# Patient Record
Sex: FEMALE | Race: WHITE | URBAN - METROPOLITAN AREA
[De-identification: names, ages, dates, MRNs, and addresses within clinical notes are randomized per-mention and may not be internally consistent; named-entity substitution may affect disease eponyms.]

---

## 2017-01-03 ENCOUNTER — HOSPITAL ENCOUNTER (OUTPATIENT)
Dept: BEHAVIORAL HEALTH | Facility: CLINIC | Age: 27
End: 2017-01-03
Attending: SOCIAL WORKER
Payer: COMMERCIAL

## 2017-01-03 PROCEDURE — H2035 A/D TX PROGRAM, PER HOUR: HCPCS | Mod: HQ

## 2017-01-04 ENCOUNTER — HOSPITAL ENCOUNTER (OUTPATIENT)
Dept: BEHAVIORAL HEALTH | Facility: CLINIC | Age: 27
End: 2017-01-04
Attending: SOCIAL WORKER
Payer: COMMERCIAL

## 2017-01-04 PROCEDURE — H2035 A/D TX PROGRAM, PER HOUR: HCPCS | Mod: HQ

## 2017-01-05 ENCOUNTER — HOSPITAL ENCOUNTER (OUTPATIENT)
Dept: BEHAVIORAL HEALTH | Facility: CLINIC | Age: 27
End: 2017-01-05
Attending: SOCIAL WORKER
Payer: COMMERCIAL

## 2017-01-05 PROCEDURE — H2035 A/D TX PROGRAM, PER HOUR: HCPCS | Mod: HQ
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
## 2017-01-12 ENCOUNTER — HOSPITAL ENCOUNTER (OUTPATIENT)
Dept: BEHAVIORAL HEALTH | Facility: CLINIC | Age: 27
End: 2017-01-12
Attending: SOCIAL WORKER
Payer: COMMERCIAL

## 2017-01-12 PROCEDURE — H2035 A/D TX PROGRAM, PER HOUR: HCPCS | Mod: HQ
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
## 2017-01-17 ENCOUNTER — HOSPITAL ENCOUNTER (OUTPATIENT)
Dept: BEHAVIORAL HEALTH | Facility: CLINIC | Age: 27
End: 2017-01-17
Attending: SOCIAL WORKER
Payer: COMMERCIAL

## 2017-01-17 PROCEDURE — H2035 A/D TX PROGRAM, PER HOUR: HCPCS | Mod: HQ

## 2017-01-18 ENCOUNTER — HOSPITAL ENCOUNTER (OUTPATIENT)
Dept: BEHAVIORAL HEALTH | Facility: CLINIC | Age: 27
End: 2017-01-18
Attending: SOCIAL WORKER
Payer: COMMERCIAL

## 2017-01-18 PROCEDURE — H2035 A/D TX PROGRAM, PER HOUR: HCPCS | Mod: HQ

## 2017-01-19 ENCOUNTER — HOSPITAL ENCOUNTER (OUTPATIENT)
Dept: BEHAVIORAL HEALTH | Facility: CLINIC | Age: 27
End: 2017-01-19
Attending: SOCIAL WORKER
Payer: COMMERCIAL

## 2017-01-19 PROCEDURE — H2035 A/D TX PROGRAM, PER HOUR: HCPCS | Mod: HQ
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
## 2017-01-23 ENCOUNTER — HOSPITAL ENCOUNTER (OUTPATIENT)
Dept: BEHAVIORAL HEALTH | Facility: CLINIC | Age: 27
End: 2017-01-23
Attending: SOCIAL WORKER
Payer: COMMERCIAL

## 2017-01-23 PROCEDURE — H2035 A/D TX PROGRAM, PER HOUR: HCPCS | Mod: HQ
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
## 2017-01-25 ENCOUNTER — TELEPHONE (OUTPATIENT)
Dept: ADDICTION MEDICINE | Facility: CLINIC | Age: 27
End: 2017-01-25

## 2017-01-25 ENCOUNTER — OFFICE VISIT (OUTPATIENT)
Dept: ADDICTION MEDICINE | Facility: CLINIC | Age: 27
End: 2017-01-25
Payer: COMMERCIAL

## 2017-01-25 VITALS
WEIGHT: 156 LBS | HEIGHT: 64 IN | DIASTOLIC BLOOD PRESSURE: 76 MMHG | SYSTOLIC BLOOD PRESSURE: 132 MMHG | OXYGEN SATURATION: 99 % | RESPIRATION RATE: 16 BRPM | HEART RATE: 91 BPM | BODY MASS INDEX: 26.63 KG/M2

## 2017-01-25 DIAGNOSIS — F19.20 CHEMICAL DEPENDENCY (H): ICD-10-CM

## 2017-01-25 DIAGNOSIS — F41.1 GAD (GENERALIZED ANXIETY DISORDER): ICD-10-CM

## 2017-01-25 DIAGNOSIS — F11.20 UNCOMPLICATED OPIOID DEPENDENCE (H): Primary | ICD-10-CM

## 2017-01-25 LAB
AMPHETAMINES UR QL: NORMAL
BARBITURATES UR QL: NORMAL
BENZODIAZ UR QL: NORMAL
BUPRENORPHINE UR QL: POSITIVE
CANNABINOIDS UR QL: NORMAL
COCAINE UR QL: NORMAL
MDA UR QL SCN: NORMAL
METHADONE UR QL SCN: NORMAL
METHAMPHET UR QL SCN: NORMAL
OPIATES UR QL SCN: NORMAL
OXYCODONE UR QL: NORMAL
PCP UR QL SCN: NORMAL
TRICYCLICS UR QL SCN: NORMAL

## 2017-01-25 PROCEDURE — 80305 DRUG TEST PRSMV DIR OPT OBS: CPT | Performed by: FAMILY MEDICINE

## 2017-01-25 PROCEDURE — 99214 OFFICE O/P EST MOD 30 MIN: CPT | Performed by: FAMILY MEDICINE

## 2017-01-25 PROCEDURE — 80306 DRUG TEST PRSMV INSTRMNT: CPT | Performed by: FAMILY MEDICINE

## 2017-01-25 RX ORDER — ESCITALOPRAM OXALATE 20 MG/1
20 TABLET ORAL DAILY
Qty: 90 TABLET | Refills: 1 | Status: SHIPPED | OUTPATIENT
Start: 2017-01-25 | End: 2017-03-20

## 2017-01-25 RX ORDER — BUPRENORPHINE HYDROCHLORIDE AND NALOXONE HYDROCHLORIDE DIHYDRATE 2; .5 MG/1; MG/1
1 TABLET SUBLINGUAL 3 TIMES DAILY
Qty: 84 TABLET | Refills: 0 | Status: SHIPPED | OUTPATIENT
Start: 2017-01-25 | End: 2017-02-17

## 2017-01-25 NOTE — TELEPHONE ENCOUNTER
PA for Suboxone 2-0.5MG tablets were submitted via [employer] at [phone]. PA was APPROVED 1/25/2017 through 1/25/2018.     Authorization# [id number]      Екатерина Baltazar MA

## 2017-01-25 NOTE — MR AVS SNAPSHOT
After Visit Summary   1/25/2017    Devika Hernández    MRN: [id number]           Patient Information     Date Of Birth          1990        Visit Information        Provider Department      1/25/2017 11:00 AM Daniel Hyatt MD [facility]        Today's Diagnoses     Uncomplicated opioid dependence (H)    -  1     Chemical dependency (H)            Follow-ups after your visit        Your next 10 appointments already scheduled     Jan 30, 2017 10:00 AM   Treatment with CO-OCCURRING DOP MIXED PHASE 2   [facility] ([facility])    [address] [place] [address]   [phone]            Feb 06, 2017 10:00 AM   Treatment with CO-OCCURRING DOP MIXED PHASE 2   [facility] ([facility])    [address] [place] [address]   [phone]            Feb 13, 2017 10:00 AM   Treatment with CO-OCCURRING DOP MIXED PHASE 2   [facility] ([facility])    [address] [place] [address]   [phone]            Feb 20, 2017 10:00 AM   Treatment with CO-OCCURRING DOP MIXED PHASE 2   [facility] ([facility])    [address] [place] [address]   [phone]            Feb 27, 2017 10:00 AM   Treatment with CO-OCCURRING DOP MIXED PHASE 2   [facility] ([facility])    [facility] [address] [address]   [phone]            Mar 06, 2017 10:00 AM   Treatment with CO-OCCURRING DOP MIXED PHASE 2   [facility] ([facility])    [facility] [address] [address]   [phone]            Mar 13, 2017 10:00 AM   Treatment  "with CO-OCCURRING DOP MIXED PHASE 2   [facility] ([facility])    [facility] [address] [address]   [phone]            Mar 20, 2017 10:00 AM   Treatment with CO-OCCURRING DOP MIXED PHASE 2   [facility] ([facility])    [address] [address]   [phone]            Mar 27, 2017 10:00 AM   Treatment with CO-OCCURRING DOP MIXED PHASE 2   [facility] ([facility])    [address] [place] [address]   [phone]            Apr 03, 2017 10:00 AM   Treatment with CO-OCCURRING DOP MIXED PHASE 2   [facility] ([facility])    [address] [place] [address]   [phone]              Who to contact     If you have questions or need follow up information about today's clinic visit or your schedule please contact [facility] directly at [phone].  Normal or non-critical lab and imaging results will be communicated to you by MyChart, letter or phone within 4 business days after the clinic has received the results. If you do not hear from us within 7 days, please contact the clinic through [employer] or phone. If you have a critical or abnormal lab result, we will notify you by phone as soon as possible.  Submit refill requests through [employer] or call your pharmacy and they will forward the refill request to us. Please allow 3 business days for your refill to be completed.          Additional Information About Your Visit        MyChart Information     HelpMeNow lets you send messages to your doctor, view your test results, renew your prescriptions, schedule appointments and more. To sign up, go to [email or web address] . Click on \"Log in\" on the left side of the " "screen, which will take you to the Welcome page. Then click on \"Sign up Now\" on the right side of the page.     You will be asked to enter the access code listed below, as well as some personal information. Please follow the directions to create your username and password.     Your access code is: [email or web address]  Expires: 2017 11:20 AM     Your access code will  in 90 days. If you need help or a new code, please call your [facility] or [phone].        Care EveryWhere ID     This is your Care EveryWhere ID. This could be used by other organizations to access your [place] medical records  [id number]        Your Vitals Were     Pulse Respirations Height BMI (Body Mass Index) Pulse Oximetry       91 16 5' 4\" (1.626 m) 26.76 kg/m2 99%        Blood Pressure from Last 3 Encounters:   17 132/76   16 122/68   16 128/69    Weight from Last 3 Encounters:   17 156 lb (70.761 kg)   16 156 lb 8 oz (70.988 kg)   16 161 lb (73.029 kg)              We Performed the Following     Buprenorphine Qual Urine     Drug abuse screen ([place], [place])          Where to get your medicines      Some of these will need a paper prescription and others can be bought over the counter.  Ask your nurse if you have questions.     Bring a paper prescription for each of these medications    - buprenorphine-naloxone 2-0.5 MG Subl sublingual tablet       Primary Care Provider    Physician No Ref-Primary       No address on file        Thank you!     Thank you for choosing [facility]  for your care. Our goal is always to provide you with excellent care. Hearing back from our patients is one way we can continue to improve our services. Please take a few minutes to complete the written survey that you may receive in the mail after your visit with us. Thank you!             Your Updated Medication List - Protect others around you: Learn how to safely use, store and throw away your " medicines at www.disposemymeds.org.          This list is accurate as of: 1/25/17 11:20 AM.  Always use your most recent med list.                   Brand Name Dispense Instructions for use    acetaminophen 325 MG tablet    TYLENOL     Take 325-650 mg by mouth every 4 hours as needed for mild pain       alum & mag hydroxide-simethicone 200-200-20 MG/5ML Susp suspension    MYLANTA/MAALOX     Take 30 mLs by mouth every 6 hours as needed for indigestion       bisacodyl 10 MG Suppository    DULCOLAX    25 suppository    Place 1 suppository (10 mg) rectally daily as needed for constipation       * buprenorphine-naloxone 2-0.5 MG Subl sublingual tablet    SUBOXONE    84 tablet    Place 1 tablet under the tongue 3 times daily       * buprenorphine-naloxone 2-0.5 MG Subl sublingual tablet    SUBOXONE    84 tablet    Place 1 tablet under the tongue 3 times daily       calcium carbonate 500 MG chewable tablet    TUMS     Take 2 chew tab by mouth 3 times daily as needed for heartburn Not to exceed 8 tabs in 24 hrs       escitalopram 10 MG tablet    LEXAPRO    30 tablet    Take 1 tablet (10 mg) by mouth daily       guaiFENesin 20 mg/mL Soln solution    ROBITUSSIN     Take 10 mLs by mouth every 4 hours as needed for cough       hydrOXYzine 50 MG capsule    VISTARIL    120 capsule    Take 2 capsules (100 mg) by mouth 3 times daily as needed for anxiety       ibuprofen 800 MG tablet    ADVIL/MOTRIN    60 tablet    Take 1 tablet (800 mg) by mouth every 8 hours as needed for moderate pain       loperamide 2 MG capsule    IMODIUM     Take 2 mg by mouth 4 times daily as needed for diarrhea       loratadine 10 MG tablet    CLARITIN     Take 10 mg by mouth daily as needed for allergies       Multi-vitamin Tabs tablet      Take 1 tablet by mouth daily Women's vit       phenol-menthol 14.5 MG lozenge      Place 1 lozenge inside cheek every 2 hours as needed for moderate pain       senna-docusate 8.6-50 MG per tablet     SENOKOT-S;PERICOLACE     Take 2 tablets by mouth daily       traZODone 50 MG tablet    DESYREL    30 tablet    Take 1-2 tablets ( mg) by mouth nightly as needed for sleep       * Notice:  This list has 2 medication(s) that are the same as other medications prescribed for you. Read the directions carefully, and ask your doctor or other care provider to review them with you.

## 2017-01-26 RX ORDER — BUPRENORPHINE HYDROCHLORIDE AND NALOXONE HYDROCHLORIDE DIHYDRATE 2; .5 MG/1; MG/1
1 TABLET SUBLINGUAL 3 TIMES DAILY
Qty: 84 TABLET | Refills: 0 | Status: SHIPPED | OUTPATIENT
Start: 2017-01-26 | End: 2017-03-20

## 2017-01-26 NOTE — PROGRESS NOTES
SUBJECTIVE:                                                    Devika Hernández is a 26 year old female who presents to clinic today for the following health issues:    OPIOID USE DISORDER - SUBOXONE FOLLOW UP:  CURRENT DOSE: 2 mg tid      DOING WELL   IN AFTERCARE  HERE WITH CHILD; VERY MOTIVATED  BROKE OFF RELATIONSHIP WITH CHILD'S FATHER  GOING TO MEETINGS          Status since last visit:    Since last visit patient has been: doing well.     Intensity:     There has been: no craving.      Suboxone Dose: adequate.   Progression of Symptoms:     Cues to use and relapse triggers: NONE    Recovery program has been: solid.   Accompanying Signs & Symptoms:    Side Effects: none.    Sobriety:     Status: no use since last visit.      Drug Screen: obtained.   Precipitating factors:    Triggers have been: mild.   Alleviating factors:    Contact with sponsor has been: helpful.     Family and support system has been: helpful.   Other Therapies Tried :     Patient has been going to recovery meetings:regularly.      Patient has been participating in professional counseling/therapy: YES    [place] Board of Pharmacy Data Base Reviewed:    YES; NO ISSUES    Problem list and histories reviewed & adjusted, as indicated.  Additional history: as documented    Patient Active Problem List   Diagnosis     Chemical dependency (H)     Uncomplicated opioid dependence (H)     No past surgical history on file.    Social History   Substance Use Topics     Smoking status: Current Every Day Smoker -- 1.00 packs/day     Smokeless tobacco: Not on file     Alcohol Use: No     Family History   Problem Relation Age of Onset     Depression Mother      Substance Abuse Mother      Substance Abuse Maternal Grandfather      Depression Sister      Anxiety Disorder Sister      Depression Other      Schizophrenia Other      Bipolar Disorder Other          Current Outpatient Prescriptions   Medication Sig Dispense Refill     buprenorphine-naloxone  (SUBOXONE) 2-0.5 MG SUBL sublingual tablet Place 1 tablet under the tongue 3 times daily 84 tablet 0     escitalopram (LEXAPRO) 20 MG tablet Take 1 tablet (20 mg) by mouth daily 90 tablet 1     escitalopram (LEXAPRO) 10 MG tablet Take 1 tablet (10 mg) by mouth daily 30 tablet 1     ibuprofen (ADVIL,MOTRIN) 800 MG tablet Take 1 tablet (800 mg) by mouth every 8 hours as needed for moderate pain 60 tablet 1     buprenorphine-naloxone (SUBOXONE) 2-0.5 MG SUBL Place 1 tablet under the tongue 3 times daily 84 tablet 0     traZODone (DESYREL) 50 MG tablet Take 1-2 tablets ( mg) by mouth nightly as needed for sleep 30 tablet 1     alum & mag hydroxide-simethicone (MYLANTA/MAALOX) 200-200-20 MG/5ML SUSP Take 30 mLs by mouth every 6 hours as needed for indigestion       guaiFENesin (ROBITUSSIN) 20 mg/mL SOLN Take 10 mLs by mouth every 4 hours as needed for cough       phenol-menthol (CEPASTAT) 14.5 MG lozenge Place 1 lozenge inside cheek every 2 hours as needed for moderate pain       loratadine (CLARITIN) 10 MG tablet Take 10 mg by mouth daily as needed for allergies       loperamide (IMODIUM) 2 MG capsule Take 2 mg by mouth 4 times daily as needed for diarrhea       senna-docusate (SENOKOT-S;PERICOLACE) 8.6-50 MG per tablet Take 2 tablets by mouth daily       multivitamin, therapeutic with minerals (MULTI-VITAMIN) TABS Take 1 tablet by mouth daily Women's vit       calcium carbonate (TUMS) 500 MG chewable tablet Take 2 chew tab by mouth 3 times daily as needed for heartburn Not to exceed 8 tabs in 24 hrs       bisacodyl (DULCOLAX) 10 MG suppository Place 1 suppository (10 mg) rectally daily as needed for constipation 25 suppository 1     hydrOXYzine (VISTARIL) 50 MG capsule Take 2 capsules (100 mg) by mouth 3 times daily as needed for anxiety 120 capsule 2     acetaminophen (TYLENOL) 325 MG tablet Take 325-650 mg by mouth every 4 hours as needed for mild pain       Allergies   Allergen Reactions     Blood-Group  "Specific Substance Other (See Comments)     Patient has an anti-[place], anti-Jkb ([place] b) and a Non-Specific antibodies. Blood product orders may be delayed. Draw two purple top tubes and one red top tube for all Type and Screen/Type and Crossmatch orders.     Ciprofloxacin Swelling     Labs reviewed in EPIC        OBJECTIVE:                                                    /76 mmHg  Pulse 91  Resp 16  Ht 5' 4\" (1.626 m)  Wt 156 lb (70.761 kg)  BMI 26.76 kg/m2  SpO2 99%  Body mass index is 26.76 kg/(m^2).     ROS:  Constitutional, HEENT, cardiovascular, pulmonary, gi and gu systems are negative, except as otherwise noted.    EXAM:  GENERAL APPEARANCE: healthy, alert and no distress  EYES: Eyes grossly normal to inspection, PERRL and conjunctivae and sclerae normal  NEURO: Normal strength and tone, mentation intact and speech normal  PSYCH: mentation appears normal and affect normal/bright  MENTAL STATUS EXAM:  Appearance/Behavior: No apparent distress and Casually groomed  Speech: Normal  Mood/Affect: normal affect  Insight: Adequate    Diagnostic Test Results:  Results for orders placed or performed in visit on 01/25/17   Buprenorphine Qual Urine   Result Value Ref Range    Buprenorphine Qual Urine Positive    Drug abuse screen (NL, RW)   Result Value Ref Range    Methamphetamine Qual Urine  NEG     Negative   Cutoff for a negative methamphetamine is 1000 ng/mL or less.      Cocaine Qual Urine  NEG     Negative   Cutoff for a negative cocaine is 300 ng/mL or less.      Cannabinoids Qual Urine  NEG     Negative   Cutoff for a negative cannabinoid is 50 ng/mL or less.      MDMA Qual Urine  NEG     Negative   Cutoff for a negative MDMA (ecstasy) is 500 ng/mL or less.      Methadone Qual Urine  NEG     Negative   Cutoff for a negative methadone is 300 ng/mL or less.      Opiates Qualitative Urine  NEG     Negative   Cutoff for a negative opiate is 300 ng/mL or less.      Benzodiazepine Qual Urine  NEG     " Negative   Cutoff for a negative benzodiazepine is 300 ng/mL or less.      Tricyc Anti Qual Urine  NEG     Negative   Cutoff for a negative tricyclic antidepressant is 1000 ng/mL or less.      Barbiturates Qual Urine  NEG     Negative   Cutoff for a negative barbituate is 300 ng/mL or less.      PCP Qual Urine  NEG     Negative   Cutoff for a negative PCP is 25 ng/mL or less.      Amphetamine Qual Urine  NEG     Negative   Cutoff for a negative amphetamine is 1000 ng/mL or less.      Oxycodone Qual Urine  NEG     Negative   Cutoff for a negative Oxycodone is 100 ng/mL or less.          ASSESSMENT:                                                    OPIOID USE DISORDER     ENCOUNTER FOR LONG TERM USE OF HIGH RISK MEDICATION   High Risk Drug Monitoring?  YES   Drug being monitored: Suboxone    Reason for drug: Opioid Use Disorder   What is being monitored?: Dosage, Cravings, Trigger, side effects, and continued abstinence.         PLAN:                                                        ICD-10-CM    1. Uncomplicated opioid dependence (H) F11.20 buprenorphine-naloxone (SUBOXONE) 2-0.5 MG SUBL sublingual tablet     buprenorphine-naloxone (SUBOXONE) 2-0.5 MG SUBL sublingual tablet   2. Chemical dependency (H) F19.20 Buprenorphine Qual Urine     Drug abuse screen ([place], RW)     buprenorphine-naloxone (SUBOXONE) 2-0.5 MG SUBL sublingual tablet   3. SYLVESTER (generalized anxiety disorder) F41.1 escitalopram (LEXAPRO) 20 MG tablet         MEDICATIONS:   Orders Placed This Encounter   Medications     buprenorphine-naloxone (SUBOXONE) 2-0.5 MG SUBL sublingual tablet     Sig: Place 1 tablet under the tongue 3 times daily     Dispense:  84 tablet     Refill:  0     escitalopram (LEXAPRO) 20 MG tablet     Sig: Take 1 tablet (20 mg) by mouth daily     Dispense:  90 tablet     Refill:  1          - Continue other medications without change  FUTURE APPOINTMENTS:       - Follow-up visit in 1 MONTH    Daniel Hyatt MD  [place]  [facility]

## 2017-01-30 ENCOUNTER — HOSPITAL ENCOUNTER (OUTPATIENT)
Dept: BEHAVIORAL HEALTH | Facility: CLINIC | Age: 27
End: 2017-01-30
Attending: SOCIAL WORKER
Payer: COMMERCIAL

## 2017-01-30 PROCEDURE — H2035 A/D TX PROGRAM, PER HOUR: HCPCS | Mod: HQ
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
## 2017-02-06 ENCOUNTER — HOSPITAL ENCOUNTER (OUTPATIENT)
Dept: BEHAVIORAL HEALTH | Facility: CLINIC | Age: 27
End: 2017-02-06
Attending: SOCIAL WORKER
Payer: COMMERCIAL

## 2017-02-06 PROCEDURE — H2035 A/D TX PROGRAM, PER HOUR: HCPCS | Mod: HQ
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
## 2017-02-13 ENCOUNTER — HOSPITAL ENCOUNTER (OUTPATIENT)
Dept: BEHAVIORAL HEALTH | Facility: CLINIC | Age: 27
End: 2017-02-13
Attending: SOCIAL WORKER
Payer: COMMERCIAL

## 2017-02-13 PROCEDURE — H2035 A/D TX PROGRAM, PER HOUR: HCPCS | Mod: HQ
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
## 2017-02-17 ENCOUNTER — TELEPHONE (OUTPATIENT)
Dept: ADDICTION MEDICINE | Facility: CLINIC | Age: 27
End: 2017-02-17

## 2017-02-17 DIAGNOSIS — F19.20 CHEMICAL DEPENDENCY (H): ICD-10-CM

## 2017-02-17 DIAGNOSIS — F11.20 UNCOMPLICATED OPIOID DEPENDENCE (H): ICD-10-CM

## 2017-02-17 RX ORDER — BUPRENORPHINE HYDROCHLORIDE AND NALOXONE HYDROCHLORIDE DIHYDRATE 2; .5 MG/1; MG/1
1 TABLET SUBLINGUAL 2 TIMES DAILY
Qty: 42 TABLET | Refills: 0 | Status: SHIPPED | OUTPATIENT
Start: 2017-02-17 | End: 2017-02-27

## 2017-02-17 NOTE — TELEPHONE ENCOUNTER
Staff called [facility] at [phone] regarding PA submit for SUBOXONE Sub Tablets 2-0.5mg. Staff was informed PA was APPROVED 1- for 42 Tablets for 21 days can be filled on February 20, 2017.  Staff was informed by [facility] representative too soon for refills. Staff called [facility] [place] at [phone] to inform above information. Staff was informed by [facility] Pharmacist patient has not have Rx's filled since October 2016 and has been going to numerous pharmacies and very hard to know when the patient is needing refills. Pharmacist informed staff patient is going out of state and wanted her Rx filled sooner. Staff will forward message to prescribing provider.      Екатерина Baltazar MA

## 2017-02-17 NOTE — TELEPHONE ENCOUNTER
Incoming call from pt requesting a bridge for subx. Pt is unable to attend her appt on 02/20, because she is traveling to [place].   Please send rx to [facility] - [place], [place] - [address]

## 2017-02-18 NOTE — TELEPHONE ENCOUNTER
Returned call; left message.  Advised if needs prescription called into pharmacy in [place] on 2/20/17 she can call us back

## 2017-02-27 ENCOUNTER — HOSPITAL ENCOUNTER (OUTPATIENT)
Dept: BEHAVIORAL HEALTH | Facility: CLINIC | Age: 27
End: 2017-02-27
Attending: SOCIAL WORKER
Payer: COMMERCIAL

## 2017-02-27 DIAGNOSIS — F19.20 CHEMICAL DEPENDENCY (H): ICD-10-CM

## 2017-02-27 DIAGNOSIS — F11.20 UNCOMPLICATED OPIOID DEPENDENCE (H): ICD-10-CM

## 2017-02-27 RX ORDER — BUPRENORPHINE HYDROCHLORIDE AND NALOXONE HYDROCHLORIDE DIHYDRATE 2; .5 MG/1; MG/1
1 TABLET SUBLINGUAL 2 TIMES DAILY
Qty: 42 TABLET | Refills: 0 | Status: SHIPPED | OUTPATIENT
Start: 2017-02-27 | End: 2017-03-20

## 2017-02-27 NOTE — TELEPHONE ENCOUNTER
Fax received 02/27/17    buprenorphine-naloxone (SUBOXONE) 2-0.5 MG SUBL      Last Written Prescription Date: 10/18/16  Last Fill Quantity: 15,  # refills: 0   Last Office Visit with [facility], P or [facility] prescribing provider: 01/25/17                                         Next 5 appointments (look out 90 days)     Mar 09, 2017  3:30 PM CST   Return Visit with Daniel Hyatt MD   [facility] ([facility])    [address] [place] [address]  [address]  [facility] [id number] [phone]

## 2017-02-27 NOTE — PROGRESS NOTES
2/27/17  Client called and LM saying they had just gotten back from [place] and will not be in group today.    Karan Green, [facility], LMFT

## 2017-03-09 ENCOUNTER — TELEPHONE (OUTPATIENT)
Dept: ADDICTION MEDICINE | Facility: CLINIC | Age: 27
End: 2017-03-09

## 2017-03-14 ENCOUNTER — TELEPHONE (OUTPATIENT)
Dept: ADDICTION MEDICINE | Facility: CLINIC | Age: 27
End: 2017-03-14

## 2017-03-14 NOTE — TELEPHONE ENCOUNTER
Reason for Call:  Other appointment    Detailed comments: pt cancel her appt for today her son is still sick, pt rescheduled for 4/10, but pt wants to know if Dr. Hyatt can see her on 3/20 instead.    Phone Number Patient can be reached at: Home number on file [phone] (home)    Best Time: anytime    Can we leave a detailed message on this number? YES    Call taken on 3/14/2017 at 8:42 AM by Yan Hollis

## 2017-03-16 ENCOUNTER — TELEPHONE (OUTPATIENT)
Dept: ADDICTION MEDICINE | Facility: CLINIC | Age: 27
End: 2017-03-16

## 2017-03-16 NOTE — TELEPHONE ENCOUNTER
Reason for Call:  Medication request Clonidine Patch    Detailed comments: pt would like Dr. Hyatt to prescribed Clonidine patch to help her with withdrawal symptoms.    Phone Number Patient can be reached at: Home number on file [phone] (home)    Best Time: anytime    Can we leave a detailed message on this number? YES    Call taken on 3/16/2017 at 1:20 PM by Yan Hollis

## 2017-03-17 NOTE — TELEPHONE ENCOUNTER
Returned call; left message.  Asked patient to let me know if she can come in 3/20/17  Offered calling in a bridge of Suboxone

## 2017-03-20 ENCOUNTER — OFFICE VISIT (OUTPATIENT)
Dept: ADDICTION MEDICINE | Facility: CLINIC | Age: 27
End: 2017-03-20
Payer: COMMERCIAL

## 2017-03-20 VITALS
WEIGHT: 153.5 LBS | DIASTOLIC BLOOD PRESSURE: 88 MMHG | RESPIRATION RATE: 16 BRPM | BODY MASS INDEX: 26.35 KG/M2 | SYSTOLIC BLOOD PRESSURE: 141 MMHG | TEMPERATURE: 98.4 F | HEART RATE: 93 BPM | OXYGEN SATURATION: 100 %

## 2017-03-20 DIAGNOSIS — F41.1 GAD (GENERALIZED ANXIETY DISORDER): ICD-10-CM

## 2017-03-20 DIAGNOSIS — F11.20 UNCOMPLICATED OPIOID DEPENDENCE (H): ICD-10-CM

## 2017-03-20 DIAGNOSIS — F19.20 CHEMICAL DEPENDENCY (H): ICD-10-CM

## 2017-03-20 LAB
AMPHETAMINES UR QL: ABNORMAL NG/ML
BARBITURATES UR QL SCN: ABNORMAL NG/ML
BENZODIAZ UR QL SCN: ABNORMAL NG/ML
BUPRENORPHINE UR QL: ABNORMAL NG/ML
CANNABINOIDS UR QL: ABNORMAL NG/ML
COCAINE UR QL SCN: ABNORMAL NG/ML
D-METHAMPHET UR QL: ABNORMAL NG/ML
METHADONE UR QL SCN: ABNORMAL NG/ML
OPIATES UR QL SCN: ABNORMAL NG/ML
OXYCODONE UR QL SCN: ABNORMAL NG/ML
PCP UR QL SCN: ABNORMAL NG/ML
PROPOXYPH UR QL: ABNORMAL NG/ML
TRICYCLICS UR QL SCN: ABNORMAL NG/ML

## 2017-03-20 PROCEDURE — 80306 DRUG TEST PRSMV INSTRMNT: CPT | Performed by: FAMILY MEDICINE

## 2017-03-20 PROCEDURE — 99214 OFFICE O/P EST MOD 30 MIN: CPT | Performed by: FAMILY MEDICINE

## 2017-03-20 RX ORDER — BUPRENORPHINE HYDROCHLORIDE AND NALOXONE HYDROCHLORIDE DIHYDRATE 2; .5 MG/1; MG/1
TABLET SUBLINGUAL
Qty: 42 TABLET | Refills: 0 | Status: SHIPPED | OUTPATIENT
Start: 2017-03-20 | End: 2017-04-17

## 2017-03-20 RX ORDER — ESCITALOPRAM OXALATE 20 MG/1
20 TABLET ORAL DAILY
Qty: 90 TABLET | Refills: 1 | Status: SHIPPED | OUTPATIENT
Start: 2017-03-20

## 2017-03-20 NOTE — MR AVS SNAPSHOT
After Visit Summary   3/20/2017    Devika Hernández    MRN: [id number]           Patient Information     Date Of Birth          1990        Visit Information        Provider Department      3/20/2017 1:45 PM Daniel Hyatt MD [facility]        Today's Diagnoses     Chemical dependency (H)        Uncomplicated opioid dependence (H)        SYLVESTER (generalized anxiety disorder)           Follow-ups after your visit        Your next 10 appointments already scheduled     Mar 27, 2017 10:00 AM CDT   Treatment with CO-OCCURRING DOP MIXED PHASE 2   [facility] ([facility])    [address] [place] [id number]   [phone]            Apr 03, 2017 10:00 AM CDT   Treatment with CO-OCCURRING DOP MIXED PHASE 2   [facility] ([facility])    [address] [place] [address]   [phone]            Apr 10, 2017 10:00 AM CDT   Treatment with CO-OCCURRING DOP MIXED PHASE 2   [facility] ([facility])    [address] [place] [address]   [phone]            Apr 10, 2017 11:00 AM CDT   Return Visit with Daniel Hyatt MD   [facility] ([facility])    [address] [place] [address]  [address] [place] [address]   [phone]              Who to contact     If you have questions or need follow up information about today's clinic visit or your schedule please contact [facility] directly at [phone].  Normal or non-critical lab and imaging results will be communicated to you by MyChart, letter or phone within 4 business days after the clinic has received the results. If you do not hear from us within 7 days, please contact the clinic through MyChart or phone. If  "you have a critical or abnormal lab result, we will notify you by phone as soon as possible.  Submit refill requests through [employer] or call your pharmacy and they will forward the refill request to us. Please allow 3 business days for your refill to be completed.          Additional Information About Your Visit        ugichemhart Information     [employer] lets you send messages to your doctor, view your test results, renew your prescriptions, schedule appointments and more. To sign up, go to [email or web address] . Click on \"Log in\" on the left side of the screen, which will take you to the Welcome page. Then click on \"Sign up Now\" on the right side of the page.     You will be asked to enter the access code listed below, as well as some personal information. Please follow the directions to create your username and password.     Your access code is: [email or web address]  Expires: 2017 12:20 PM     Your access code will  in 90 days. If you need help or a new code, please call your [place] clinic or [phone].        Care EveryWhere ID     This is your Care EveryWhere ID. This could be used by other organizations to access your [place] medical records  [id number]        Your Vitals Were     Pulse Temperature Respirations Pulse Oximetry BMI (Body Mass Index)       93 98.4  F (36.9  C) (Oral) 16 100% 26.35 kg/m2        Blood Pressure from Last 3 Encounters:   17 141/88   17 132/76   16 122/68    Weight from Last 3 Encounters:   17 153 lb 8 oz (69.6 kg)   17 156 lb (70.8 kg)   16 156 lb 8 oz (71 kg)              Today, you had the following     No orders found for display         Today's Medication Changes          These changes are accurate as of: 3/20/17  2:09 PM.  If you have any questions, ask your nurse or doctor.               These medicines have changed or have updated prescriptions.        Dose/Directions    buprenorphine-naloxone 2-0.5 MG Subl sublingual tablet "   Commonly known as:  SUBOXONE   This may have changed:    - how much to take  - how to take this  - when to take this  - additional instructions  - Another medication with the same name was removed. Continue taking this medication, and follow the directions you see here.   Used for:  Chemical dependency (H), Uncomplicated opioid dependence (H)   Changed by:  Daniel Hyatt MD        Take 1.5 tabs daily in divided doses   Quantity:  42 tablet   Refills:  0       escitalopram 20 MG tablet   Commonly known as:  LEXAPRO   This may have changed:  Another medication with the same name was removed. Continue taking this medication, and follow the directions you see here.   Used for:  SYLVESTER (generalized anxiety disorder)   Changed by:  Daniel Hyatt MD        Dose:  20 mg   Take 1 tablet (20 mg) by mouth daily   Quantity:  90 tablet   Refills:  1            Where to get your medicines      These medications were sent to [facility] [address] [place] [address] AT [facility] & [address] [address]     Phone:  [phone]     escitalopram 20 MG tablet         Some of these will need a paper prescription and others can be bought over the counter.  Ask your nurse if you have questions.     Bring a paper prescription for each of these medications     buprenorphine-naloxone 2-0.5 MG Subl sublingual tablet                Primary Care Provider    Physician No Ref-Primary       No address on file        Thank you!     Thank you for choosing [facility]  for your care. Our goal is always to provide you with excellent care. Hearing back from our patients is one way we can continue to improve our services. Please take a few minutes to complete the written survey that you may receive in the mail after your visit with us. Thank you!             Your Updated Medication List - Protect others around you: Learn how to safely use, store and throw away your  medicines at www.disposemymeds.org.          This list is accurate as of: 3/20/17  2:09 PM.  Always use your most recent med list.                   Brand Name Dispense Instructions for use    acetaminophen 325 MG tablet    TYLENOL     Take 325-650 mg by mouth every 4 hours as needed for mild pain       alum & mag hydroxide-simethicone 200-200-20 MG/5ML Susp suspension    MYLANTA/MAALOX     Take 30 mLs by mouth every 6 hours as needed for indigestion       bisacodyl 10 MG Suppository    DULCOLAX    25 suppository    Place 1 suppository (10 mg) rectally daily as needed for constipation       buprenorphine-naloxone 2-0.5 MG Subl sublingual tablet    SUBOXONE    42 tablet    Take 1.5 tabs daily in divided doses       calcium carbonate 500 MG chewable tablet    TUMS     Take 2 chew tab by mouth 3 times daily as needed for heartburn Not to exceed 8 tabs in 24 hrs       escitalopram 20 MG tablet    LEXAPRO    90 tablet    Take 1 tablet (20 mg) by mouth daily       guaiFENesin 20 mg/mL Soln solution    ROBITUSSIN     Take 10 mLs by mouth every 4 hours as needed for cough       hydrOXYzine 50 MG capsule    VISTARIL    120 capsule    Take 2 capsules (100 mg) by mouth 3 times daily as needed for anxiety       ibuprofen 800 MG tablet    ADVIL/MOTRIN    60 tablet    Take 1 tablet (800 mg) by mouth every 8 hours as needed for moderate pain       loperamide 2 MG capsule    IMODIUM     Take 2 mg by mouth 4 times daily as needed for diarrhea       loratadine 10 MG tablet    CLARITIN     Take 10 mg by mouth daily as needed for allergies       Multi-vitamin Tabs tablet      Take 1 tablet by mouth daily Women's vit       phenol-menthol 14.5 MG lozenge      Place 1 lozenge inside cheek every 2 hours as needed for moderate pain       senna-docusate 8.6-50 MG per tablet    SENOKOT-S;PERICOLACE     Take 2 tablets by mouth daily       traZODone 50 MG tablet    DESYREL    30 tablet    Take 1-2 tablets ( mg) by mouth nightly as needed  for sleep
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
## 2017-04-17 ENCOUNTER — TELEPHONE (OUTPATIENT)
Dept: ADDICTION MEDICINE | Facility: CLINIC | Age: 27
End: 2017-04-17

## 2017-04-17 DIAGNOSIS — F11.20 UNCOMPLICATED OPIOID DEPENDENCE (H): ICD-10-CM

## 2017-04-17 DIAGNOSIS — F19.20 CHEMICAL DEPENDENCY (H): ICD-10-CM

## 2017-04-17 RX ORDER — BUPRENORPHINE HYDROCHLORIDE AND NALOXONE HYDROCHLORIDE DIHYDRATE 2; .5 MG/1; MG/1
TABLET SUBLINGUAL
Qty: 23 TABLET | Refills: 0 | Status: SHIPPED | OUTPATIENT
Start: 2017-04-17 | End: 2017-05-02

## 2017-04-17 NOTE — TELEPHONE ENCOUNTER
Reason for Call:  Bridge for Suboxone     Detailed comments: pt re-scheduled her appt to 5/2/17, and she's requesting a bridge until the 5/2.  Pharmacy is [facility] in [place].     Phone Number Patient can be reached at: Home number on file [phone] (home)    Best Time: anytime    Can we leave a detailed message on this number? YES    Call taken on 4/17/2017 at 9:15 AM by Yan Hollis

## 2017-05-02 ENCOUNTER — OFFICE VISIT (OUTPATIENT)
Dept: ADDICTION MEDICINE | Facility: CLINIC | Age: 27
End: 2017-05-02
Payer: COMMERCIAL

## 2017-05-02 VITALS
OXYGEN SATURATION: 100 % | HEART RATE: 87 BPM | SYSTOLIC BLOOD PRESSURE: 110 MMHG | HEIGHT: 64 IN | BODY MASS INDEX: 25.35 KG/M2 | WEIGHT: 148.5 LBS | TEMPERATURE: 98.3 F | RESPIRATION RATE: 18 BRPM | DIASTOLIC BLOOD PRESSURE: 68 MMHG

## 2017-05-02 DIAGNOSIS — F11.20 UNCOMPLICATED OPIOID DEPENDENCE (H): ICD-10-CM

## 2017-05-02 DIAGNOSIS — F19.20 CHEMICAL DEPENDENCY (H): ICD-10-CM

## 2017-05-02 PROCEDURE — 99214 OFFICE O/P EST MOD 30 MIN: CPT | Performed by: FAMILY MEDICINE

## 2017-05-02 PROCEDURE — 80306 DRUG TEST PRSMV INSTRMNT: CPT | Performed by: FAMILY MEDICINE

## 2017-05-02 RX ORDER — BUPRENORPHINE HYDROCHLORIDE AND NALOXONE HYDROCHLORIDE DIHYDRATE 2; .5 MG/1; MG/1
TABLET SUBLINGUAL
Qty: 28 TABLET | Refills: 0 | Status: SHIPPED | OUTPATIENT
Start: 2017-05-02 | End: 2017-05-30

## 2017-05-02 NOTE — PROGRESS NOTES
SUBJECTIVE:                                                    Devika Hernández is a 26 year old female who presents to clinic today for the following health issues:    OPIOID USE DISORDER - SUBOXONE FOLLOW UP:  CURRENT DOSE: 3 mg       STRUGGLING A LITTLE  STRESS  ANXIOUS  TIRED  USED ADDERALL AND THC    DISCUSSED  NEEDS TO STOP    WANTS TO REDUCE SUBOXONE TO 2 MG    Status since last visit:    Since last visit patient has been: STRUGGLING   Intensity:     There has been: no craving.      Suboxone Dose: adequate.   Progression of Symptoms:     Cues to use and relapse triggers: NONE    Recovery program has been: solid.   Accompanying Signs & Symptoms:    Side Effects: none.    Sobriety:     Status: no use since last visit.      Drug Screen: obtained.   Precipitating factors:    Triggers have been: mild.   Alleviating factors:    Contact with sponsor has been: helpful.     Family and support system has been: helpful.   Other Therapies Tried :     Patient has been going to recovery meetings:regularly.      Patient has been participating in professional counseling/therapy: YES    [place] Board of Pharmacy Data Base Reviewed:    YES; NO ISSUES; CHECKED 5/2/17    Problem list and histories reviewed & adjusted, as indicated.  Additional history: as documented    Patient Active Problem List   Diagnosis     Chemical dependency (H)     Uncomplicated opioid dependence (H)     No past surgical history on file.    Social History   Substance Use Topics     Smoking status: Current Every Day Smoker     Packs/day: 1.00     Smokeless tobacco: Not on file     Alcohol use No     Family History   Problem Relation Age of Onset     Depression Mother      Substance Abuse Mother      Substance Abuse Maternal Grandfather      Depression Sister      Anxiety Disorder Sister      Depression Other      Schizophrenia Other      Bipolar Disorder Other          Current Outpatient Prescriptions   Medication Sig Dispense Refill      buprenorphine-naloxone (SUBOXONE) 2-0.5 MG SUBL sublingual tablet Take 1/2 tab 2 times daily in divided doses 28 tablet 0     escitalopram (LEXAPRO) 20 MG tablet Take 1 tablet (20 mg) by mouth daily 90 tablet 1     ibuprofen (ADVIL,MOTRIN) 800 MG tablet Take 1 tablet (800 mg) by mouth every 8 hours as needed for moderate pain 60 tablet 1     traZODone (DESYREL) 50 MG tablet Take 1-2 tablets ( mg) by mouth nightly as needed for sleep 30 tablet 1     alum & mag hydroxide-simethicone (MYLANTA/MAALOX) 200-200-20 MG/5ML SUSP Take 30 mLs by mouth every 6 hours as needed for indigestion       guaiFENesin (ROBITUSSIN) 20 mg/mL SOLN Take 10 mLs by mouth every 4 hours as needed for cough       phenol-menthol (CEPASTAT) 14.5 MG lozenge Place 1 lozenge inside cheek every 2 hours as needed for moderate pain       loratadine (CLARITIN) 10 MG tablet Take 10 mg by mouth daily as needed for allergies       loperamide (IMODIUM) 2 MG capsule Take 2 mg by mouth 4 times daily as needed for diarrhea       senna-docusate (SENOKOT-S;PERICOLACE) 8.6-50 MG per tablet Take 2 tablets by mouth daily       multivitamin, therapeutic with minerals (MULTI-VITAMIN) TABS Take 1 tablet by mouth daily Women's vit       calcium carbonate (TUMS) 500 MG chewable tablet Take 2 chew tab by mouth 3 times daily as needed for heartburn Not to exceed 8 tabs in 24 hrs       bisacodyl (DULCOLAX) 10 MG suppository Place 1 suppository (10 mg) rectally daily as needed for constipation 25 suppository 1     hydrOXYzine (VISTARIL) 50 MG capsule Take 2 capsules (100 mg) by mouth 3 times daily as needed for anxiety 120 capsule 2     acetaminophen (TYLENOL) 325 MG tablet Take 325-650 mg by mouth every 4 hours as needed for mild pain       Allergies   Allergen Reactions     Blood-Group Specific Substance Other (See Comments)     Patient has an anti-Townsend, anti-Jkb (Almeida b) and a Non-Specific antibodies. Blood product orders may be delayed. Draw two purple top tubes  "and one red top tube for all Type and Screen/Type and Crossmatch orders.     Ciprofloxacin Swelling     Labs reviewed in EPIC        OBJECTIVE:                                                    /68  Pulse 87  Temp 98.3  F (36.8  C) (Oral)  Resp 18  Ht 5' 4\" (1.626 m)  Wt 148 lb 8 oz (67.4 kg)  SpO2 100%  BMI 25.49 kg/m2  Body mass index is 25.49 kg/(m^2).     ROS:  Constitutional, HEENT, cardiovascular, pulmonary, gi and gu systems are negative, except as otherwise noted.    EXAM:  GENERAL APPEARANCE: healthy, alert and no distress  EYES: Eyes grossly normal to inspection, PERRL and conjunctivae and sclerae normal  NEURO: Normal strength and tone, mentation intact and speech normal  PSYCH: mentation appears normal and affect normal/bright  MENTAL STATUS EXAM:  Appearance/Behavior: No apparent distress and Casually groomed  Speech: Normal  Mood/Affect: normal affect  Insight: Adequate    Diagnostic Test Results:  Results for orders placed or performed in visit on 05/02/17   Urine Drugs of Abuse Screen Panel 13   Result Value Ref Range    Cannabinoids (39-tyr-2-carboxy-9-THC) (A) NDET ng/mL     Detected, Abnormal Result   Cutoff for a positive cannabinoid is greater than 50 ng/ml.   This is an unconfirmed screening result to be used for medical purposes only.   Order [id number] for confirmation or individual confirmation tests to [employer].      Phencyclidine (Phencyclidine)  NDET ng/mL     Not Detected   Cutoff for a negative PCP is 25 ng/mL or less.      Cocaine (Benzoylecgonine)  NDET ng/mL     Not Detected   Cutoff for a negative cocaine is 150 ng/ml or less.      Methamphetamine (d-Methamphetamine)  NDET ng/mL     Not Detected   Cutoff for a negative methamphetamine is 500 ng/ml or less.      Opiates (Morphine)  NDET ng/mL     Not Detected   Cutoff for a negative opiate is 100 ng/ml or less.      Amphetamine (d-Amphetamine) (A) NDET ng/mL     Detected, Abnormal Result   Cutoff for a positive amphetamine " is greater than 500 ng/ml.   This is an unconfirmed screening result to be used for medical purposes only.   Order [id number] for confirmation or individual confirmation tests to [employer].      Benzodiazepines (Nordiazepam)  NDET ng/mL     Not Detected   Cutoff for a negative benzodiazepine is 150 ng/ml or less.      Tricyclic Antidepressants (Desipramine)  NDET ng/mL     Not Detected   Cutoff for a negative tricyclic antidepressant is 300 ng/ml or less.      Methadone (Methadone)  NDET ng/mL     Not Detected   Cutoff for a negative methadone is 200 ng/ml or less.      Barbiturates (Butalbital)  NDET ng/mL     Not Detected   Cutoff for a negative barbituate is 200 ng/ml or less.      Oxycodone (Oxycodone)  NDET ng/mL     Not Detected   Cutoff for a negative Oxycodone is 100 ng/mL or less.      Propoxyphene (Norpropoxyphene)  NDET ng/mL     Not Detected   Cutoff for a negative propoxyphene is 300 ng/ml or less      Buprenorphine (Buprenorphine) (A) NDET ng/mL     Detected, Abnormal Result   Cutoff for a positive buprenorphine is greater than 10 ng/ml.   This is an unconfirmed screening result to be used for medical purposes only.   Order [id number] for confirmation or individual confirmation tests to [employer].          ASSESSMENT:                                                    OPIOID USE DISORDER     ENCOUNTER FOR LONG TERM USE OF HIGH RISK MEDICATION   High Risk Drug Monitoring?  YES   Drug being monitored: Suboxone    Reason for drug: Opioid Use Disorder   What is being monitored?: Dosage, Cravings, Trigger, side effects, and continued abstinence.         PLAN:                                                        ICD-10-CM    1. Chemical dependency (H) F19.20 Urine Drugs of Abuse Screen Panel 13     buprenorphine-naloxone (SUBOXONE) 2-0.5 MG SUBL sublingual tablet   2. Uncomplicated opioid dependence (H) F11.20 buprenorphine-naloxone (SUBOXONE) 2-0.5 MG SUBL sublingual tablet         MEDICATIONS:   Orders Placed This  Encounter   Medications     buprenorphine-naloxone (SUBOXONE) 2-0.5 MG SUBL sublingual tablet     Sig: Take 1/2 tab 2 times daily in divided doses     Dispense:  28 tablet     Refill:  0          - Continue other medications without change  FUTURE APPOINTMENTS:       - Follow-up visit in 1 MONTH    Daniel Hyatt MD  [facility] PRIMARY CARE

## 2017-05-02 NOTE — MR AVS SNAPSHOT
"              After Visit Summary   2017    Devika Hernández    MRN: [id number]           Patient Information     Date Of Birth          1990        Visit Information        Provider Department      2017 10:00 AM Daniel [place]att [place] [facility] [place]'s Diagnoses     Chemical dependency (H)        Uncomplicated opioid dependence (H)           Follow-ups after your visit        Who to contact     If you have questions or need follow up information about today's clinic visit or your schedule please contact [facility] directly at [phone].  Normal or non-critical lab and imaging results will be communicated to you by Xceediumhart, letter or phone within 4 business days after the clinic has received the results. If you do not hear from us within 7 days, please contact the clinic through Xceediumhart or phone. If you have a critical or abnormal lab result, we will notify you by phone as soon as possible.  Submit refill requests through Padinmotion or call your pharmacy and they will forward the refill request to us. Please allow 3 business days for your refill to be completed.          Additional Information About Your Visit        MyChart Information     Padinmotion lets you send messages to your doctor, view your test results, renew your prescriptions, schedule appointments and more. To sign up, go to [email or web address] . Click on \"Log in\" on the left side of the screen, which will take you to the Welcome page. Then click on \"Sign up Now\" on the right side of the page.     You will be asked to enter the access code listed below, as well as some personal information. Please follow the directions to create your username and password.     Your access code is: [email or web address]  Expires: 2017 10:32 AM     Your access code will  in 90 days. If you need help or a new code, please call your [facility] or [phone].        Care EveryWhere ID  " "   This is your Care EveryWhere ID. This could be used by other organizations to access your [place] medical records  [id number]        Your Vitals Were     Pulse Temperature Respirations Height Pulse Oximetry BMI (Body Mass Index)    87 98.3  F (36.8  C) (Oral) 18 5' 4\" (1.626 m) 100% 25.49 kg/m2       Blood Pressure from Last 3 Encounters:   05/02/17 110/68   03/20/17 141/88   01/25/17 132/76    Weight from Last 3 Encounters:   05/02/17 148 lb 8 oz (67.4 kg)   03/20/17 153 lb 8 oz (69.6 kg)   01/25/17 156 lb (70.8 kg)              We Performed the Following     Urine Drugs of Abuse Screen Panel 13          Today's Medication Changes          These changes are accurate as of: 5/2/17 10:32 AM.  If you have any questions, ask your nurse or doctor.               These medicines have changed or have updated prescriptions.        Dose/Directions    buprenorphine-naloxone 2-0.5 MG Subl sublingual tablet   Commonly known as:  SUBOXONE   This may have changed:  additional instructions   Used for:  Chemical dependency (H), Uncomplicated opioid dependence (H)   Changed by:  Daniel Hyatt MD        Take 1/2 tab 2 times daily in divided doses   Quantity:  28 tablet   Refills:  0            Where to get your medicines      Some of these will need a paper prescription and others can be bought over the counter.  Ask your nurse if you have questions.     Bring a paper prescription for each of these medications     buprenorphine-naloxone 2-0.5 MG Subl sublingual tablet                Primary Care Provider    Physician No Ref-Primary       No address on file        Thank you!     Thank you for choosing [facility]  for your care. Our goal is always to provide you with excellent care. Hearing back from our patients is one way we can continue to improve our services. Please take a few minutes to complete the written survey that you may receive in the mail after your visit with us. Thank you!      "        Your Updated Medication List - Protect others around you: Learn how to safely use, store and throw away your medicines at www.disposemymeds.org.          This list is accurate as of: 5/2/17 10:32 AM.  Always use your most recent med list.                   Brand Name Dispense Instructions for use    acetaminophen 325 MG tablet    TYLENOL     Take 325-650 mg by mouth every 4 hours as needed for mild pain       alum & mag hydroxide-simethicone 200-200-20 MG/5ML Susp suspension    MYLANTA/MAALOX     Take 30 mLs by mouth every 6 hours as needed for indigestion       bisacodyl 10 MG Suppository    DULCOLAX    25 suppository    Place 1 suppository (10 mg) rectally daily as needed for constipation       buprenorphine-naloxone 2-0.5 MG Subl sublingual tablet    SUBOXONE    28 tablet    Take 1/2 tab 2 times daily in divided doses       calcium carbonate 500 MG chewable tablet    TUMS     Take 2 chew tab by mouth 3 times daily as needed for heartburn Not to exceed 8 tabs in 24 hrs       escitalopram 20 MG tablet    LEXAPRO    90 tablet    Take 1 tablet (20 mg) by mouth daily       guaiFENesin 20 mg/mL Soln solution    ROBITUSSIN     Take 10 mLs by mouth every 4 hours as needed for cough       hydrOXYzine 50 MG capsule    VISTARIL    120 capsule    Take 2 capsules (100 mg) by mouth 3 times daily as needed for anxiety       ibuprofen 800 MG tablet    ADVIL/MOTRIN    60 tablet    Take 1 tablet (800 mg) by mouth every 8 hours as needed for moderate pain       loperamide 2 MG capsule    IMODIUM     Take 2 mg by mouth 4 times daily as needed for diarrhea       loratadine 10 MG tablet    CLARITIN     Take 10 mg by mouth daily as needed for allergies       Multi-vitamin Tabs tablet      Take 1 tablet by mouth daily Women's vit       phenol-menthol 14.5 MG lozenge      Place 1 lozenge inside cheek every 2 hours as needed for moderate pain       senna-docusate 8.6-50 MG per tablet    SENOKOT-S;PERICOLACE     Take 2 tablets by  mouth daily       traZODone 50 MG tablet    DESYREL    30 tablet    Take 1-2 tablets ( mg) by mouth nightly as needed for sleep

## 2017-05-30 ENCOUNTER — TELEPHONE (OUTPATIENT)
Dept: ADDICTION MEDICINE | Facility: CLINIC | Age: 27
End: 2017-05-30

## 2017-05-30 DIAGNOSIS — F11.20 UNCOMPLICATED OPIOID DEPENDENCE (H): ICD-10-CM

## 2017-05-30 DIAGNOSIS — F19.20 CHEMICAL DEPENDENCY (H): ICD-10-CM

## 2017-05-30 RX ORDER — BUPRENORPHINE HYDROCHLORIDE AND NALOXONE HYDROCHLORIDE DIHYDRATE 2; .5 MG/1; MG/1
TABLET SUBLINGUAL
Qty: 13 TABLET | Refills: 0 | Status: SHIPPED | OUTPATIENT
Start: 2017-05-30

## 2017-05-30 NOTE — TELEPHONE ENCOUNTER
Reason for Call:   prescription    Detailed comments: Pt states she is unable to attend her appt today, because she cannot afford gas. Pt would like a bridge for subx. Pharmacy of choice: [facility] [address] [place] [address] - [address] [place] [address] & [facility]    Phone Number Patient can be reached at: Home number on file [phone] (home)    Best Time: Anytime    Can we leave a detailed message on this number? YES    Call taken on 5/30/2017 at 9:25 AM by Milly Nolan

## 2017-06-13 ENCOUNTER — TELEPHONE (OUTPATIENT)
Dept: ADDICTION MEDICINE | Facility: CLINIC | Age: 27
End: 2017-06-13